# Patient Record
Sex: FEMALE | Race: WHITE | NOT HISPANIC OR LATINO | Employment: FULL TIME
[De-identification: names, ages, dates, MRNs, and addresses within clinical notes are randomized per-mention and may not be internally consistent; named-entity substitution may affect disease eponyms.]

---

## 2017-11-19 ENCOUNTER — HEALTH MAINTENANCE LETTER (OUTPATIENT)
Age: 27
End: 2017-11-19

## 2020-03-02 ENCOUNTER — HEALTH MAINTENANCE LETTER (OUTPATIENT)
Age: 30
End: 2020-03-02

## 2020-12-14 ENCOUNTER — HEALTH MAINTENANCE LETTER (OUTPATIENT)
Age: 30
End: 2020-12-14

## 2021-04-18 ENCOUNTER — HEALTH MAINTENANCE LETTER (OUTPATIENT)
Age: 31
End: 2021-04-18

## 2021-10-02 ENCOUNTER — HEALTH MAINTENANCE LETTER (OUTPATIENT)
Age: 31
End: 2021-10-02

## 2022-05-14 ENCOUNTER — HEALTH MAINTENANCE LETTER (OUTPATIENT)
Age: 32
End: 2022-05-14

## 2022-09-03 ENCOUNTER — HEALTH MAINTENANCE LETTER (OUTPATIENT)
Age: 32
End: 2022-09-03

## 2022-12-06 ENCOUNTER — LAB REQUISITION (OUTPATIENT)
Dept: LAB | Facility: CLINIC | Age: 32
End: 2022-12-06

## 2022-12-06 DIAGNOSIS — Z11.3 ENCOUNTER FOR SCREENING FOR INFECTIONS WITH A PREDOMINANTLY SEXUAL MODE OF TRANSMISSION: ICD-10-CM

## 2022-12-06 DIAGNOSIS — Z01.419 ENCOUNTER FOR GYNECOLOGICAL EXAMINATION (GENERAL) (ROUTINE) WITHOUT ABNORMAL FINDINGS: ICD-10-CM

## 2022-12-06 PROCEDURE — 87491 CHLMYD TRACH DNA AMP PROBE: CPT | Performed by: OBSTETRICS & GYNECOLOGY

## 2022-12-06 PROCEDURE — 87624 HPV HI-RISK TYP POOLED RSLT: CPT | Performed by: OBSTETRICS & GYNECOLOGY

## 2022-12-06 PROCEDURE — G0145 SCR C/V CYTO,THINLAYER,RESCR: HCPCS | Performed by: OBSTETRICS & GYNECOLOGY

## 2022-12-06 PROCEDURE — 87591 N.GONORRHOEAE DNA AMP PROB: CPT | Performed by: OBSTETRICS & GYNECOLOGY

## 2022-12-07 LAB
C TRACH DNA SPEC QL PROBE+SIG AMP: NEGATIVE
N GONORRHOEA DNA SPEC QL NAA+PROBE: NEGATIVE

## 2022-12-08 LAB
BKR LAB AP GYN ADEQUACY: NORMAL
BKR LAB AP GYN INTERPRETATION: NORMAL
BKR LAB AP HPV REFLEX: NORMAL
BKR LAB AP LMP: NORMAL
BKR LAB AP PREVIOUS ABNL DX: NORMAL
BKR LAB AP PREVIOUS ABNORMAL: NORMAL
PATH REPORT.COMMENTS IMP SPEC: NORMAL
PATH REPORT.COMMENTS IMP SPEC: NORMAL
PATH REPORT.RELEVANT HX SPEC: NORMAL

## 2022-12-11 LAB
HUMAN PAPILLOMA VIRUS 16 DNA: NEGATIVE
HUMAN PAPILLOMA VIRUS 18 DNA: NEGATIVE
HUMAN PAPILLOMA VIRUS FINAL DIAGNOSIS: NORMAL
HUMAN PAPILLOMA VIRUS OTHER HR: NEGATIVE

## 2023-01-09 ENCOUNTER — HOSPITAL ENCOUNTER (OUTPATIENT)
Dept: RESEARCH | Facility: CLINIC | Age: 33
Discharge: HOME | End: 2023-01-09
Attending: PEDIATRICS | Admitting: PEDIATRICS

## 2023-01-09 PROCEDURE — 300N000007 HC RESEARCH B&I SPECIMEN PROCESSING, SIMPLE

## 2023-01-09 PROCEDURE — 510N000023 HC CRU B&I PATIENT CARE, PER 15 MIN

## 2023-01-09 PROCEDURE — 510N000009 HC RESEARCH FACILITY, PER 15 MIN

## 2023-01-09 NOTE — PROGRESS NOTES
Moderna CMV Vaccine Study Screening Note    Date: 2023    INFECTIOUS DISEASES   Clinical Research Unit ~ [facility]  Floor 1, [address]  [address] [place] [address]  Office: [phone]  Fax: [phone]     Pt: Lamar Del Castillo  MR: [id number]  : 1990  MARCO: 2023     Lamar is a healthy 32-year-old female who presented to the Clinical Research Unit at the [facility] for a screening visit for the Moderna [id number] CMV Vaccine study.      Physical Exam     GENERAL: Active, alert, in no acute distress.  SKIN: Clear. No significant rash, abnormal pigmentation or lesions.  HEAD: Normocephalic.  EYES: Pupils equal, round, reactive, Extraocular muscles intact. Normal conjunctivae.  EARS: Normal canals. Tympanic membranes are normal; gray and translucent. Some scarring of L TM.  NOSE: Normal without discharge.  MOUTH/THROAT: Clear. No oral lesions. Teeth without obvious abnormalities.  NECK: Supple, no masses. No thyromegaly.  LYMPH NODES: No adenopathy.   LUNGS: Clear. No rales, rhonchi, wheezing or retractions.  HEART: Regular rhythm. Normal S1/S2. No murmurs. Normal pulses.  ABDOMEN: Soft, non-tender, not distended, no masses or hepatosplenomegaly. Bowel sounds normal.   NEUROLOGIC: No focal findings.   EXTREMITIES: Full range of motion, no deformities.     Assessment: Lamar is a healthy 32-year-old female who presented today for a screening visit in the Moderna CMV Vaccine study. Lamar is well today, meets enrollment criteria, and has been consented to participate.      Plan: A copy of the informed consent form was provided to the participant prior to the screening visit. The Moderna CMV Vaccine Trial ([id number]) consent discussion occurred between the study PI, , and the participant. The informed consent was discussed, including study drug administration (3 IM injections of vaccine or placebo), study  assessments and the difference between what is routine clinical care and what is required as part of participation in the Moderna CMV Vaccine study. The participant was given adequate time to review the consent form and discuss the study with the research team. All questions were answered to the best ability of the research team. The participant agreed to enroll in the study. The participant signed/dated the study informed consent. The study PI confirmed participant's understanding of the study. The study PI also signed the study consent. Consent was signed prior to any study procedures being performed. A copy of the signed documents was provided to participant.    As part of today's visit, the participant's history was taken and a physical exam was completed. Specimens were collected per protocol. All information was documented on paper study source documents.    A follow up appointment has been made per protocol, at which time the participant will be randomized and enrolled in the study pending results of today's procedures. The CMV seropositive cohort of the study is currently closed, so the participant wouldn't be eligible to enroll should her results come back demonstrating seropositive status. We will be in contact with the participant upon receipt of the results. The participant has been provided instructions for contacting study team members if questions or concerns arise. The participant has been instructed to inform the study team members if she visits urgent care, the ED and/or is hospitalized.    Physician Attestation: I have examined this patient and reviewed all relevant assessments available. Lamar Del Castillo meets enrollment criteria for the CMV Vaccine trial and has been consented to participate.    Denver Montano MD  01/09/23  12:01 PM

## 2023-06-03 ENCOUNTER — HEALTH MAINTENANCE LETTER (OUTPATIENT)
Age: 33
End: 2023-06-03

## 2024-07-06 ENCOUNTER — HEALTH MAINTENANCE LETTER (OUTPATIENT)
Age: 34
End: 2024-07-06

## 2025-07-13 ENCOUNTER — HEALTH MAINTENANCE LETTER (OUTPATIENT)
Age: 35
End: 2025-07-13